# Patient Record
Sex: MALE | Race: WHITE | NOT HISPANIC OR LATINO | Employment: FULL TIME | URBAN - METROPOLITAN AREA
[De-identification: names, ages, dates, MRNs, and addresses within clinical notes are randomized per-mention and may not be internally consistent; named-entity substitution may affect disease eponyms.]

---

## 2017-09-26 ENCOUNTER — OFFICE VISIT (OUTPATIENT)
Dept: FAMILY MEDICINE CLINIC | Facility: CLINIC | Age: 37
End: 2017-09-26

## 2017-09-26 VITALS
OXYGEN SATURATION: 98 % | SYSTOLIC BLOOD PRESSURE: 146 MMHG | DIASTOLIC BLOOD PRESSURE: 92 MMHG | BODY MASS INDEX: 31.89 KG/M2 | TEMPERATURE: 98 F | WEIGHT: 235.4 LBS | HEART RATE: 82 BPM | HEIGHT: 72 IN

## 2017-09-26 DIAGNOSIS — E55.9 VITAMIN D DEFICIENCY: ICD-10-CM

## 2017-09-26 DIAGNOSIS — I10 ESSENTIAL HYPERTENSION: ICD-10-CM

## 2017-09-26 DIAGNOSIS — Z00.00 ROUTINE ADULT HEALTH MAINTENANCE: Primary | ICD-10-CM

## 2017-09-26 PROCEDURE — 99385 PREV VISIT NEW AGE 18-39: CPT | Performed by: FAMILY MEDICINE

## 2017-09-26 RX ORDER — MELATONIN
1000 DAILY
COMMUNITY
End: 2022-07-08

## 2017-09-26 RX ORDER — CHLORAL HYDRATE 500 MG
CAPSULE ORAL
COMMUNITY
End: 2022-07-08

## 2017-09-26 RX ORDER — LISINOPRIL 10 MG/1
10 TABLET ORAL DAILY
Qty: 90 TABLET | Refills: 3 | Status: SHIPPED | OUTPATIENT
Start: 2017-09-26 | End: 2022-07-08

## 2017-09-26 RX ORDER — LISINOPRIL 10 MG/1
10 TABLET ORAL DAILY
COMMUNITY
End: 2017-09-26 | Stop reason: SDUPTHER

## 2017-09-26 RX ORDER — LANOLIN ALCOHOL/MO/W.PET/CERES
1000 CREAM (GRAM) TOPICAL DAILY
COMMUNITY
End: 2022-07-08

## 2017-09-26 RX ORDER — GLUCOSAMINE SULFATE 500 MG
CAPSULE ORAL
COMMUNITY
End: 2022-07-08

## 2017-09-26 NOTE — PROGRESS NOTES
"  Chief Complaint   Patient presents with   • New Patient   • Annual Exam   • Hypertension       Subjective   Igor Smith is a 37 y.o. male and is here for a yearly physical exam. The patient reports no problems.    Do you take any herbs or supplements that were not prescribed by a doctor? yes. If so, these will be added to active medication list.    The following portions of the patient's history were reviewed and updated as appropriate: allergies, current medications, past family history, past medical history, past social history, past surgical history and problem list.    Social and Family and Surgical History reviewed and updated today, see Rooming tab.    Health History, Preventive Measures and Vaccination flow sheets reviewed and updated today.    Patient's current medical chart in Epic; including previous office notes, imaging, labs, specialist's evaluation either in notes or in Media tab reviewed today.    Other pertinent medical information also reviewed thru Care Everywhere function is also reviewed today.    Review of Systems  Review of Systems  A comprehensive review of systems was negative except for: Musculoskeletal: positive for left hip pain    Vitals:    09/26/17 1125   BP: 146/92   BP Location: Left arm   Patient Position: Sitting   Pulse: 82   Temp: 98 °F (36.7 °C)   SpO2: 98%   Weight: 235 lb 6.4 oz (107 kg)   Height: 72\" (182.9 cm)       General Appearance:  Alert, cooperative, no distress, appears stated age   Head:  Normocephalic, without obvious abnormality, atraumatic   Eyes:  PERRL, conjunctiva/corneas clear, EOM's intact.   Ears:  Normal TM's and external ear canals, both ears   Nose: Nares normal, septum midline, mucosa normal, no drainage or sinus tenderness   Throat: Lips, mucosa, and tongue normal; teeth and gums normal   Neck: Supple, symmetrical, trachea midline, no adenopathy;   thyroid: No enlargement/tenderness/nodules; no carotid  bruit   Back:  Symmetric, no curvature, ROM " normal, no CVA tenderness   Lungs:  Clear to auscultation bilaterally, respirations unlabored   Chest wall:  No tenderness or deformity   Heart:  Regular rate and rhythm, S1 and S2 normal, no murmur, rub or gallop   Abdomen:  Soft, non-tender, bowel sounds active all four quadrants,   no masses, no organomegaly   Rectal:        Extremities: Extremities normal, atraumatic, no cyanosis or edema   Pulses: 2+ and symmetric all extremities   Skin: Skin color, texture, turgor normal, no rashes or lesions   Lymph nodes: Cervical, supraclavicular, and axillary nodes normal   Neurologic: CNII-XII intact. Normal strength, sensation and reflexes   throughout        No results found for this or any previous visit.  Assessment/Plan   Healthy male exam.  Igor was seen today for new patient, annual exam and hypertension.    Diagnoses and all orders for this visit:    Routine adult health maintenance    Essential hypertension  -     lisinopril (PRINIVIL,ZESTRIL) 10 MG tablet; Take 1 tablet by mouth Daily. Indications: High Blood Pressure Disorder  -     Lipid Panel; Future  -     CBC (No Diff); Future  -     Comprehensive Metabolic Panel; Future  -     Urinalysis With / Microscopic If Indicated; Future    Vitamin D deficiency  -     Vitamin D 25 Hydroxy; Future      1. New patient from Dr Hamilton's closed practice  His wife est care with me as well a few weeks ago  Time to restart his lisinopril, has dose done well to lost 50 lbs, but has leveled off and bp still not to goal  May have hip surgery soon  2. Patient Counseling:  --Nutrition: Stressed importance of moderation in sodium/caffeine intake, saturated fat and cholesterol.  Discussed caloric balance, sufficient intake of fresh fruits, vegetables, fiber, calcium, iron. Keep working  --  --Exercise: Stressed the importance of regular exercise. restart  --Substance Abuse: Discussed cessation/primary prevention of tobacco, alcohol, or other drug use; driving or other dangerous  activities under the influence. Good here   --Dental health: Discussed importance of regular tooth brushing, flossing, and dental visits. Next week  -- suggested having eyes and vision checked if needed or past due.  --Immunizations reviewed.  --  3. Discussed the patient's BMI with him.  The BMI is above average; BMI management plan is completed  4. Follow up in 6 months    There are no Patient Instructions on file for this visit.    Medications Discontinued During This Encounter   Medication Reason   • Chlorcyclizine-Pseudoephed (STAHIST AD) 25-60 MG tablet Therapy completed   • lisinopril (PRINIVIL,ZESTRIL) 10 MG tablet Reorder        Dr. Jimmy Galvan MD  [place], [place]  [facility]

## 2018-03-26 ENCOUNTER — RESULTS ENCOUNTER (OUTPATIENT)
Dept: FAMILY MEDICINE CLINIC | Facility: CLINIC | Age: 38
End: 2018-03-26

## 2018-03-26 DIAGNOSIS — I10 ESSENTIAL HYPERTENSION: ICD-10-CM

## 2018-03-26 DIAGNOSIS — E55.9 VITAMIN D DEFICIENCY: ICD-10-CM

## 2021-03-20 ENCOUNTER — IMMUNIZATION (OUTPATIENT)
Dept: VACCINE CLINIC | Facility: HOSPITAL | Age: 41
End: 2021-03-20

## 2021-03-20 PROCEDURE — 91300 HC SARSCOV02 VAC 30MCG/0.3ML IM: CPT | Performed by: INTERNAL MEDICINE

## 2021-03-20 PROCEDURE — 0001A: CPT | Performed by: INTERNAL MEDICINE

## 2021-04-10 ENCOUNTER — IMMUNIZATION (OUTPATIENT)
Dept: VACCINE CLINIC | Facility: HOSPITAL | Age: 41
End: 2021-04-10

## 2021-04-10 PROCEDURE — 0002A: CPT | Performed by: INTERNAL MEDICINE

## 2021-04-10 PROCEDURE — 91300 HC SARSCOV02 VAC 30MCG/0.3ML IM: CPT | Performed by: INTERNAL MEDICINE

## 2021-10-14 ENCOUNTER — TELEPHONE (OUTPATIENT)
Dept: FAMILY MEDICINE CLINIC | Facility: CLINIC | Age: 41
End: 2021-10-14

## 2021-10-14 NOTE — TELEPHONE ENCOUNTER
Caller: Igor Smith    Relationship: Self    What was the call regarding: PATIENT IS ESTABLISHED WITH DR. ABDI AS HIS PCP, WAS LAST SEEN BY HER IN November OF 2020, CAN WE CHANGE HIS PCP IN [facility]?

## 2021-10-19 ENCOUNTER — IMMUNIZATION (OUTPATIENT)
Dept: VACCINE CLINIC | Facility: HOSPITAL | Age: 41
End: 2021-10-19

## 2021-10-19 PROCEDURE — 91300 HC SARSCOV02 VAC 30MCG/0.3ML IM: CPT | Performed by: INTERNAL MEDICINE

## 2021-10-19 PROCEDURE — 0004A ADM SARSCOV2 30MCG/0.3ML BOOSTER: CPT | Performed by: INTERNAL MEDICINE

## 2022-07-08 ENCOUNTER — OFFICE VISIT (OUTPATIENT)
Dept: INTERNAL MEDICINE | Facility: CLINIC | Age: 42
End: 2022-07-08

## 2022-07-08 VITALS
DIASTOLIC BLOOD PRESSURE: 84 MMHG | SYSTOLIC BLOOD PRESSURE: 138 MMHG | HEART RATE: 88 BPM | OXYGEN SATURATION: 98 % | BODY MASS INDEX: 33.77 KG/M2 | WEIGHT: 249 LBS | RESPIRATION RATE: 16 BRPM

## 2022-07-08 DIAGNOSIS — Z11.59 ENCOUNTER FOR HEPATITIS C SCREENING TEST FOR LOW RISK PATIENT: ICD-10-CM

## 2022-07-08 DIAGNOSIS — Z00.00 ROUTINE GENERAL MEDICAL EXAMINATION AT A HEALTH CARE FACILITY: Primary | ICD-10-CM

## 2022-07-08 PROBLEM — M16.12 PRIMARY OSTEOARTHRITIS OF LEFT HIP: Status: ACTIVE | Noted: 2022-07-08

## 2022-07-08 PROBLEM — Z96.642 STATUS POST LEFT HIP REPLACEMENT: Status: ACTIVE | Noted: 2018-01-17

## 2022-07-08 PROCEDURE — 99386 PREV VISIT NEW AGE 40-64: CPT | Performed by: INTERNAL MEDICINE

## 2022-07-08 NOTE — PROGRESS NOTES
Chief Complaint   Patient presents with   • Annual Exam       Subjective   History of Present Illness    Igor Smith is a 42 y.o. male here for annual wellness. Pt does not have any acute issues to discuss today. States overall things are going well. Taking all meds as prescribed without any issues.     The following portions of the patient's history were reviewed and updated as appropriate: allergies, current medications, past family history, past medical history, past social history, past surgical history, and problem list.    Patient Care Team:  Crystal Ugarte MD as PCP - General (Internal Medicine & Pediatrics)    Review of Systems   Constitutional: Negative for activity change, chills and fever.   HENT: Negative for congestion, ear pain, rhinorrhea and sore throat.    Eyes: Negative for double vision, pain and visual disturbance.   Respiratory: Negative for cough, shortness of breath and wheezing.    Cardiovascular: Negative for chest pain, palpitations and leg swelling.   Gastrointestinal: Negative for abdominal pain, blood in stool, constipation, diarrhea, nausea and vomiting.   Endocrine: Negative for cold intolerance and heat intolerance.   Genitourinary: Negative for difficulty urinating, dysuria and frequency.   Musculoskeletal: Negative for arthralgias and myalgias.   Skin: Negative for rash and skin lesions.   Neurological: Negative for dizziness, tremors and headache.   Hematological: Negative for adenopathy. Does not bruise/bleed easily.   Psychiatric/Behavioral: Negative for behavioral problems and suicidal ideas.       Body mass index is 33.77 kg/m².   Vitals:    07/08/22 1046   BP: 138/84   BP Location: Left arm   Patient Position: Sitting   Cuff Size: Large Adult   Pulse: 88   Resp: 16   SpO2: 98%   Weight: 113 kg (249 lb)        Objective   Physical Exam  Vitals and nursing note reviewed.   Constitutional:       General: He is not in acute distress.     Appearance: Normal appearance.    HENT:      Head: Normocephalic and atraumatic.      Right Ear: Tympanic membrane and ear canal normal.      Left Ear: Tympanic membrane and ear canal normal.      Nose: Nose normal.      Mouth/Throat:      Mouth: Mucous membranes are moist.      Pharynx: Oropharynx is clear.   Eyes:      Extraocular Movements: Extraocular movements intact.      Conjunctiva/sclera: Conjunctivae normal.      Pupils: Pupils are equal, round, and reactive to light.   Cardiovascular:      Rate and Rhythm: Normal rate and regular rhythm.      Heart sounds: Normal heart sounds. No murmur heard.  Pulmonary:      Effort: Pulmonary effort is normal. No respiratory distress.      Breath sounds: Normal breath sounds. No wheezing or rhonchi.   Abdominal:      General: Bowel sounds are normal. There is no distension.      Palpations: Abdomen is soft. There is no mass.      Tenderness: There is no abdominal tenderness.      Comments: no hepatosplenomegaly   Musculoskeletal:         General: No deformity. Normal range of motion.      Cervical back: Normal range of motion and neck supple.   Skin:     General: Skin is warm and dry.      Capillary Refill: Capillary refill takes less than 2 seconds.      Findings: No lesion or rash.   Neurological:      General: No focal deficit present.      Mental Status: He is alert and oriented to person, place, and time.      Cranial Nerves: No cranial nerve deficit.   Psychiatric:         Mood and Affect: Mood normal.         Behavior: Behavior normal.         Judgment: Judgment normal.       No current outpatient medications on file.       Health Maintenance   Topic Date Due   • INFLUENZA VACCINE  10/01/2022   • TDAP/TD VACCINES (2 - Td or Tdap) 06/01/2026        Immunization History   Administered Date(s) Administered   • COVID-19 (PFIZER) PURPLE CAP 03/20/2021, 04/10/2021, 10/19/2021   • Tdap 06/01/2016       Assessment & Plan     Annual Wellness  - Labs ordered today including CBC, CMP, Fasting lipid panel,  HgbA1C, TSH, Vit D and Hep C. Will call with results once available and make follow up plan and med changes as appropriate.   - Cont current medications for chronic medical issues, refills sent as needed today.   - EKG not indicated  - Cscope not yet indicated, due at 48 yo  - Prostate CA screening- not yet indicated based on age and lack of risk factors for premature disease  - Lung CA screening not indicated  - AAA screening not indicated   - Immunizations: Flu shot due Fall 2022. COVID series and booster completed. TDaP done 2016, UTD. Hep A series completed prior to travel for work in 2000s.    - Depression screen reviewed with patient and negative.  - Advised behavioral modifications including dietary changes and increased exercise with goal of healthy weight and lifestyle.       Return in about 1 year (around 7/8/2023) for Annual physical.     Katarina Ugarte MD  [facility] [place] [place] [facility]  Phone: [phone]  Fax: [phone]

## 2022-07-09 LAB
25(OH)D3+25(OH)D2 SERPL-MCNC: 35.5 NG/ML (ref 30–100)
ALBUMIN SERPL-MCNC: 4.8 G/DL (ref 4–5)
ALBUMIN/GLOB SERPL: 2.3 {RATIO} (ref 1.2–2.2)
ALP SERPL-CCNC: 65 IU/L (ref 44–121)
ALT SERPL-CCNC: 35 IU/L (ref 0–44)
AST SERPL-CCNC: 28 IU/L (ref 0–40)
BASOPHILS # BLD AUTO: 0 X10E3/UL (ref 0–0.2)
BASOPHILS NFR BLD AUTO: 1 %
BILIRUB SERPL-MCNC: 0.6 MG/DL (ref 0–1.2)
BUN SERPL-MCNC: 14 MG/DL (ref 6–24)
BUN/CREAT SERPL: 18 (ref 9–20)
CALCIUM SERPL-MCNC: 9.8 MG/DL (ref 8.7–10.2)
CHLORIDE SERPL-SCNC: 101 MMOL/L (ref 96–106)
CHOLEST SERPL-MCNC: 255 MG/DL (ref 100–199)
CO2 SERPL-SCNC: 25 MMOL/L (ref 20–29)
CREAT SERPL-MCNC: 0.78 MG/DL (ref 0.76–1.27)
EGFRCR SERPLBLD CKD-EPI 2021: 116 ML/MIN/1.73
EOSINOPHIL # BLD AUTO: 0 X10E3/UL (ref 0–0.4)
EOSINOPHIL NFR BLD AUTO: 1 %
ERYTHROCYTE [DISTWIDTH] IN BLOOD BY AUTOMATED COUNT: 12.3 % (ref 11.6–15.4)
GLOBULIN SER CALC-MCNC: 2.1 G/DL (ref 1.5–4.5)
GLUCOSE SERPL-MCNC: 94 MG/DL (ref 65–99)
HBA1C MFR BLD: 5.8 % (ref 4.8–5.6)
HCT VFR BLD AUTO: 47.9 % (ref 37.5–51)
HCV AB S/CO SERPL IA: <0.1 S/CO RATIO (ref 0–0.9)
HDLC SERPL-MCNC: 41 MG/DL
HGB BLD-MCNC: 16.1 G/DL (ref 13–17.7)
IMM GRANULOCYTES # BLD AUTO: 0 X10E3/UL (ref 0–0.1)
IMM GRANULOCYTES NFR BLD AUTO: 0 %
LDLC SERPL CALC-MCNC: 176 MG/DL (ref 0–99)
LYMPHOCYTES # BLD AUTO: 2.4 X10E3/UL (ref 0.7–3.1)
LYMPHOCYTES NFR BLD AUTO: 41 %
MCH RBC QN AUTO: 29.8 PG (ref 26.6–33)
MCHC RBC AUTO-ENTMCNC: 33.6 G/DL (ref 31.5–35.7)
MCV RBC AUTO: 89 FL (ref 79–97)
MONOCYTES # BLD AUTO: 0.6 X10E3/UL (ref 0.1–0.9)
MONOCYTES NFR BLD AUTO: 10 %
NEUTROPHILS # BLD AUTO: 2.8 X10E3/UL (ref 1.4–7)
NEUTROPHILS NFR BLD AUTO: 47 %
PLATELET # BLD AUTO: 170 X10E3/UL (ref 150–450)
POTASSIUM SERPL-SCNC: 4.5 MMOL/L (ref 3.5–5.2)
PROT SERPL-MCNC: 6.9 G/DL (ref 6–8.5)
RBC # BLD AUTO: 5.4 X10E6/UL (ref 4.14–5.8)
SODIUM SERPL-SCNC: 141 MMOL/L (ref 134–144)
TRIGL SERPL-MCNC: 200 MG/DL (ref 0–149)
TSH SERPL DL<=0.005 MIU/L-ACNC: 1.65 UIU/ML (ref 0.45–4.5)
VLDLC SERPL CALC-MCNC: 38 MG/DL (ref 5–40)
WBC # BLD AUTO: 5.8 X10E3/UL (ref 3.4–10.8)

## 2022-07-26 ENCOUNTER — TELEPHONE (OUTPATIENT)
Dept: INTERNAL MEDICINE | Facility: CLINIC | Age: 42
End: 2022-07-26

## 2022-07-26 NOTE — TELEPHONE ENCOUNTER
Caller: Igor Smith KENNY    Relationship: Self    Best call back number:[phone] (H)    What is the best time to reach you: ANYTIME, ASAP    Who are you requesting to speak with (clinical staff, provider,  specific staff member): CLINICAL STAFF    Do you know the name of the person who called: IGOR DONALD    What was the call regarding: PATIENT STATES HE WAS BILLED FOR [facility] DOS 07/08/2022 FOR VITAMIN D LAB TESTING BECAUSE THE INSURANCE REQUIRES A LETTER STATING THAT THE PATIENT HAS A HISTORY OF VITAMIN D DEFICIENCY BEFORE THEY WILL PAY FOR THIS IN FULL FOR THE PATIENT, THE LETTER NEEDS TO BE A STATEMENT FROM DR ABDI STATING THE PATIENT NEEDED TO HAVE VITAMIN D TESTING AS PART OF HIS ANNUAL PHYSICAL, PLEASE ADVISE PATIENT IF THIS LETTER CAN BE PROVIDED ON LETTERHEAD FROM THE OFFICE AND SIGNED BY DR ABDI SO PATIENT CAN PICK THIS UP AND PROVIDE THIS TO HIS INSURANCE ASAP    Do you require a callback: YES, PLEASE ADVISE PATIENT WHEN READY TO BE PICKED UP

## 2022-08-15 ENCOUNTER — TELEPHONE (OUTPATIENT)
Dept: INTERNAL MEDICINE | Facility: CLINIC | Age: 42
End: 2022-08-15

## 2022-08-15 NOTE — TELEPHONE ENCOUNTER
Caller: Igor Smith    Relationship: Self    Best call back number:     What medication are you requesting:     What are your current symptoms: GOUT    How long have you been experiencing symptoms: RIGHT BIG TOE NUMB, PT HAS GOUT    Have you had these symptoms before:    [] Yes  [x] No    Have you been treated for these symptoms before:   [] Yes  [x] No    If a prescription is needed, what is your preferred pharmacy and phone number:  [facility]  [address] [place] [address]  [phone]    Additional notes: PATIENT IS CALLING IN STATING THAT HE HAS GOUT AND HE IS FEELING THAT HIS TOE IS NUMB AT TIMES AND FEELS LIKE IT IS THROBBING

## 2022-08-15 NOTE — TELEPHONE ENCOUNTER
Gout is usually more associated with severe pain than numbness, so may not be related to gout, does he feel like this is his typical flare? Any redness or swelling in that joint? He may need to actually be seen because if it is not gout than we need to figure out why his toes are going numb

## 2022-08-16 ENCOUNTER — OFFICE VISIT (OUTPATIENT)
Dept: INTERNAL MEDICINE | Facility: CLINIC | Age: 42
End: 2022-08-16

## 2022-08-16 VITALS
HEIGHT: 72 IN | DIASTOLIC BLOOD PRESSURE: 82 MMHG | BODY MASS INDEX: 33.72 KG/M2 | RESPIRATION RATE: 16 BRPM | WEIGHT: 249 LBS | HEART RATE: 83 BPM | SYSTOLIC BLOOD PRESSURE: 140 MMHG | TEMPERATURE: 98.2 F | OXYGEN SATURATION: 96 %

## 2022-08-16 DIAGNOSIS — M79.671 RIGHT FOOT PAIN: Primary | ICD-10-CM

## 2022-08-16 PROCEDURE — 99213 OFFICE O/P EST LOW 20 MIN: CPT | Performed by: INTERNAL MEDICINE

## 2022-08-16 RX ORDER — LISINOPRIL 10 MG/1
10 TABLET ORAL DAILY
COMMUNITY

## 2022-08-16 RX ORDER — CHLORAL HYDRATE 500 MG
CAPSULE ORAL
COMMUNITY

## 2022-08-16 NOTE — PROGRESS NOTES
"Chief Complaint  Toe Pain    Subjective          Igor Smith presents to [facility] for R big toe pain. First noticed about 1 week ago with some numbness, pins and needles sensation. Did have some throbbing. Tried to walk it out and monitoring and didn't seem to be improving. With socks and shoes it does seem to resolve and is not noticeable, but then when shoes came off the pain and tenderness seemed to be extending more proximally. No erythema or swelling compared to L foot.   Has never had gout or anything like this in the past.     Objective   Vital Signs:     /82   Pulse 83   Temp 98.2 °F (36.8 °C)   Resp 16   Ht 182.9 cm (72\")   Wt 113 kg (249 lb)   SpO2 96%   BMI 33.77 kg/m²     Physical Exam  Vitals and nursing note reviewed.   Constitutional:       General: He is not in acute distress.     Appearance: Normal appearance.   Cardiovascular:      Pulses: Normal pulses.           Dorsalis pedis pulses are 2+ on the right side and 2+ on the left side.        Posterior tibial pulses are 2+ on the right side and 2+ on the left side.   Pulmonary:      Effort: Pulmonary effort is normal. No respiratory distress.   Feet:      Right foot:      Skin integrity: Skin integrity normal.      Left foot:      Skin integrity: Skin integrity normal.      Comments: R big toe with no overlying erythema, no edema, no warmth to touch, TTP over navicular and at head of 1st MT, full ROM of hallux but with palpable \"rubbing\" of flexor tendon with passive and active ROM, 2 sec cap refill  Neurological:      Mental Status: He is alert.          Result Review :   The following data was reviewed by: Crystal Ugarte MD on 08/16/2022:  CMP    CMP 7/8/22   Glucose 94   BUN 14   Creatinine 0.78   Sodium 141   Potassium 4.5   Chloride 101   Calcium 9.8   Total Protein 6.9   Albumin 4.8   Globulin 2.1   Total Bilirubin 0.6   Alkaline Phosphatase 65   AST (SGOT) 28   ALT (SGPT) " 35           CBC w/diff    CBC w/Diff 7/8/22   WBC 5.8   RBC 5.40   Hemoglobin 16.1   Hematocrit 47.9   MCV 89   MCH 29.8   MCHC 33.6   RDW 12.3   Platelets 170   Neutrophil Rel % 47   Lymphocyte Rel % 41   Monocyte Rel % 10   Eosinophil Rel % 1   Basophil Rel % 1           Lipid Panel    Lipid Panel 7/8/22   Total Cholesterol 255 (A)   Triglycerides 200 (A)   HDL Cholesterol 41   VLDL Cholesterol 38   LDL Cholesterol  176 (A)   (A) Abnormal value            TSH    TSH 7/8/22   TSH 1.650           Most Recent A1C    HGBA1C Most Recent 7/8/22   Hemoglobin A1C 5.8 (A)   (A) Abnormal value       Comments are available for some flowsheets but are not being displayed.           Assessment and Plan      Diagnoses and all orders for this visit:    1. Right foot pain (Primary)   - defer imaging until he sees the foot specialist   - suspect a flexor tendonopathy given exam, may benefit from injection therapy      Orders:  -     Ambulatory Referral to Podiatry        Follow Up   Return for Next scheduled follow up.    Patient was given instructions and counseling regarding his condition or for health maintenance advice. Please see specific information pulled into the AVS if appropriate.     Katarina Ugarte MD  [facility] [place] [facility] [place] [facility]  Phone: [phone]  Fax: [phone]

## 2022-09-20 ENCOUNTER — TELEPHONE (OUTPATIENT)
Dept: INTERNAL MEDICINE | Facility: CLINIC | Age: 42
End: 2022-09-20

## 2022-09-20 NOTE — TELEPHONE ENCOUNTER
Caller: Kayla Hernandez    Relationship: Self    Best call back number: [phone]    What medication are you requesting: PIN WORM MEDICATION    What are your current symptoms: PINWORM      If a prescription is needed, what is your preferred pharmacy and phone number: [facility] [address] - [phone]  - [phone] FX     Additional notes:

## 2022-09-21 ENCOUNTER — TELEPHONE (OUTPATIENT)
Dept: INTERNAL MEDICINE | Facility: CLINIC | Age: 42
End: 2022-09-21

## 2022-09-21 RX ORDER — ALBENDAZOLE 200 MG/1
TABLET, FILM COATED ORAL
Qty: 4 TABLET | Refills: 0 | Status: SHIPPED | OUTPATIENT
Start: 2022-09-21

## 2022-09-21 RX ORDER — ALBENDAZOLE 200 MG/1
TABLET, FILM COATED ORAL
Qty: 4 TABLET | Refills: 0 | Status: SHIPPED | OUTPATIENT
Start: 2022-09-21 | End: 2022-09-21 | Stop reason: SDUPTHER

## 2022-09-21 NOTE — TELEPHONE ENCOUNTER
Will agree since his children have this diagnosis but if he develops symptoms or does not respond to this he will have to have an appt  Dosing is different for adults, its 400 mg not 200, but have sent this in to the  pharmacy

## 2022-09-21 NOTE — TELEPHONE ENCOUNTER
PATIENT CALLED WANTING TO REQUEST MEDICATION   albendazole (ALBENZA) 200 MG tablet  TO GO TO     [facility] [address] [place], [place] [address]Y - [phone]  - [phone] FX  [phone]    CALL BACK NUMBER [phone]

## 2022-09-21 NOTE — TELEPHONE ENCOUNTER
CORRECTION  PATIENT WOULD LIKE THIS MEDICATION SENT TO THE [facility]       [facility] [address] - [phone]  - [phone] FX

## 2022-09-21 NOTE — TELEPHONE ENCOUNTER
Caller: Igor Smith    Relationship: Self    Best call back number: [phone]    What medication are you requesting: PIN WORM MEDICATION     What are your current symptoms: PIN WORM     If a prescription is needed, what is your preferred pharmacy and phone number:  [id number] - [place], [place] - [address] [address] [address] - [phone] ph - [phone]     Additional notes: BOTH OF THE PATIENTS CHILDREN HAVE BEEN DIAGNOSED WITH PIN WORM. THE CHILDRENS PCP ADVISED THE PATIENT AND HIS WIFE TO GO AHEAD AND GET THIS MEDICATION FOR THEM AS WELL.

## 2022-09-22 NOTE — TELEPHONE ENCOUNTER
Just sent you another message on this, I sent in the treatment yesterday but looks like somehow the message had gotten to Dr. Santos as well so he had also called it in and my order may have cancelled his, so just want to verify if he was able to pick it up and make sure the pharamcy has it

## 2023-10-17 ENCOUNTER — OFFICE VISIT (OUTPATIENT)
Dept: INTERNAL MEDICINE | Facility: CLINIC | Age: 43
End: 2023-10-17
Payer: COMMERCIAL

## 2023-10-17 VITALS
SYSTOLIC BLOOD PRESSURE: 180 MMHG | TEMPERATURE: 96.2 F | RESPIRATION RATE: 22 BRPM | OXYGEN SATURATION: 98 % | WEIGHT: 257 LBS | BODY MASS INDEX: 34.81 KG/M2 | HEART RATE: 80 BPM | HEIGHT: 72 IN | DIASTOLIC BLOOD PRESSURE: 98 MMHG

## 2023-10-17 DIAGNOSIS — R05.9 COUGH, UNSPECIFIED TYPE: Primary | ICD-10-CM

## 2023-10-17 PROCEDURE — 99213 OFFICE O/P EST LOW 20 MIN: CPT | Performed by: INTERNAL MEDICINE

## 2023-10-20 NOTE — PROGRESS NOTES
"Chief Complaint  Cough and Fatigue    Subjective        Igor Smith presents to [facility]  History of Present Illness  Here with cough, congestion, fatigue; no chest pain, no sob      Objective   Vital Signs:  /98   Pulse 80   Temp 96.2 °F (35.7 °C)   Resp 22   Ht 182.9 cm (72\")   Wt 117 kg (257 lb)   SpO2 98%   BMI 34.86 kg/m²   Estimated body mass index is 34.86 kg/m² as calculated from the following:    Height as of this encounter: 182.9 cm (72\").    Weight as of this encounter: 117 kg (257 lb).             Physical Exam  Vitals and nursing note reviewed.   Constitutional:       General: He is not in acute distress.     Appearance: Normal appearance.   HENT:      Head: Normocephalic and atraumatic.      Right Ear: External ear normal.      Left Ear: External ear normal.      Nose: Nose normal.      Mouth/Throat:      Mouth: Mucous membranes are moist.      Pharynx: Oropharynx is clear.   Eyes:      Extraocular Movements: Extraocular movements intact.      Conjunctiva/sclera: Conjunctivae normal.      Pupils: Pupils are equal, round, and reactive to light.   Cardiovascular:      Rate and Rhythm: Normal rate and regular rhythm.      Pulses: Normal pulses.      Heart sounds: Normal heart sounds. No murmur heard.     No gallop.   Pulmonary:      Effort: Pulmonary effort is normal.      Breath sounds: Normal breath sounds.   Abdominal:      General: Abdomen is flat. Bowel sounds are normal. There is no distension.      Palpations: Abdomen is soft. There is no mass.      Tenderness: There is no abdominal tenderness.   Musculoskeletal:         General: No swelling. Normal range of motion.      Cervical back: Normal range of motion and neck supple.   Skin:     General: Skin is warm and dry.      Findings: No rash.   Neurological:      General: No focal deficit present.      Mental Status: He is alert and oriented to person, place, and time. Mental status " is at baseline.   Psychiatric:         Mood and Affect: Mood normal.         Behavior: Behavior normal.        Result Review :                   Assessment and Plan   Diagnoses and all orders for this visit:    1. Cough, unspecified type (Primary)    - supportive care, fluids, hydration  - rtc worsening, change in illness  - tylenol as needed         Follow Up   No follow-ups on file.  Patient was given instructions and counseling regarding his condition or for health maintenance advice. Please see specific information pulled into the AVS if appropriate.

## 2023-11-14 ENCOUNTER — OFFICE VISIT (OUTPATIENT)
Dept: INTERNAL MEDICINE | Facility: CLINIC | Age: 43
End: 2023-11-14
Payer: COMMERCIAL

## 2023-11-14 VITALS
DIASTOLIC BLOOD PRESSURE: 82 MMHG | BODY MASS INDEX: 34.54 KG/M2 | HEART RATE: 79 BPM | HEIGHT: 72 IN | SYSTOLIC BLOOD PRESSURE: 128 MMHG | WEIGHT: 255 LBS

## 2023-11-14 DIAGNOSIS — Z00.00 ROUTINE GENERAL MEDICAL EXAMINATION AT A HEALTH CARE FACILITY: Primary | ICD-10-CM

## 2023-11-14 DIAGNOSIS — E55.9 VITAMIN D DEFICIENCY: ICD-10-CM

## 2023-11-14 DIAGNOSIS — I10 ESSENTIAL HYPERTENSION: ICD-10-CM

## 2023-11-14 PROCEDURE — 99396 PREV VISIT EST AGE 40-64: CPT | Performed by: INTERNAL MEDICINE

## 2023-11-14 NOTE — PROGRESS NOTES
"Chief Complaint   Patient presents with    Annual Exam       Subjective   History of Present Illness    Igor Smith is a 43 y.o. male here for annual wellness. Pt does not have any acute issues to discuss today. States overall things are going well. Taking all meds as prescribed without any issues.   BP was back in normal range today, was seen a couple weeks ago and was very elevated. Is not currently taking any medications.     The following portions of the patient's history were reviewed and updated as appropriate: allergies, current medications, past family history, past medical history, past social history, past surgical history, and problem list.    Patient Care Team:  Crystal Ugarte MD as PCP - General (Internal Medicine & Pediatrics)    Review of Systems   Constitutional:  Negative for activity change, chills and fever.   HENT:  Negative for congestion, ear pain, rhinorrhea and sore throat.    Eyes:  Negative for double vision, pain and visual disturbance.   Respiratory:  Negative for cough, shortness of breath and wheezing.    Cardiovascular:  Negative for chest pain, palpitations and leg swelling.   Gastrointestinal:  Negative for abdominal pain, blood in stool, constipation, diarrhea, nausea and vomiting.   Endocrine: Negative for cold intolerance and heat intolerance.   Genitourinary:  Negative for difficulty urinating, dysuria and frequency.   Musculoskeletal:  Negative for arthralgias and myalgias.   Skin:  Negative for rash and skin lesions.   Neurological:  Negative for dizziness, tremors and headache.   Hematological:  Negative for adenopathy. Does not bruise/bleed easily.   Psychiatric/Behavioral:  Negative for behavioral problems and suicidal ideas.        Body mass index is 34.58 kg/m².   Vitals:    11/14/23 0938   BP: 128/82   BP Location: Left arm   Patient Position: Sitting   Cuff Size: Adult   Pulse: 79   Weight: 116 kg (255 lb)   Height: 182.9 cm (72.01\")   PF: 99 L/min    "     Objective   Physical Exam  Vitals and nursing note reviewed.   Constitutional:       General: He is not in acute distress.     Appearance: Normal appearance.   HENT:      Head: Normocephalic and atraumatic.      Right Ear: Tympanic membrane and ear canal normal.      Left Ear: Tympanic membrane and ear canal normal.      Nose: Nose normal.      Mouth/Throat:      Mouth: Mucous membranes are moist.      Pharynx: Oropharynx is clear.   Eyes:      Extraocular Movements: Extraocular movements intact.      Conjunctiva/sclera: Conjunctivae normal.      Pupils: Pupils are equal, round, and reactive to light.   Cardiovascular:      Rate and Rhythm: Normal rate and regular rhythm.      Heart sounds: Normal heart sounds. No murmur heard.  Pulmonary:      Effort: Pulmonary effort is normal. No respiratory distress.      Breath sounds: Normal breath sounds. No wheezing or rhonchi.   Abdominal:      General: Bowel sounds are normal. There is no distension.      Palpations: Abdomen is soft. There is no mass.      Tenderness: There is no abdominal tenderness.      Comments: no hepatosplenomegaly   Musculoskeletal:         General: No deformity. Normal range of motion.      Cervical back: Normal range of motion and neck supple.   Skin:     General: Skin is warm and dry.      Capillary Refill: Capillary refill takes less than 2 seconds.      Findings: No lesion or rash.   Neurological:      General: No focal deficit present.      Mental Status: He is alert and oriented to person, place, and time.      Cranial Nerves: No cranial nerve deficit.   Psychiatric:         Mood and Affect: Mood normal.         Behavior: Behavior normal.         Judgment: Judgment normal.         No current outpatient medications on file.     Lab Results   Component Value Date    HGBA1C 5.8 (H) 07/08/2022    TSH 1.650 07/08/2022    RVIC72OC 35.5 07/08/2022        Health Maintenance   Topic Date Due    INFLUENZA VACCINE  Never done    COVID-19 Vaccine (4  - 2023-24 season) 09/01/2023    ANNUAL PHYSICAL  11/14/2024    BMI FOLLOWUP  11/14/2024    TDAP/TD VACCINES (2 - Td or Tdap) 06/01/2026    HEPATITIS C SCREENING  Completed    Pneumococcal Vaccine 0-64  Aged Out        Immunization History   Administered Date(s) Administered    COVID-19 (PFIZER) Purple Cap Monovalent 03/20/2021, 04/10/2021, 10/19/2021    Tdap 06/01/2016       Assessment & Plan     Annual Wellness  - Labs ordered today including CBC, CMP, Fasting lipid panel, HgbA1C, TSH, and Vit D. Will call with results once available and make follow up plan and med changes as appropriate.   - Cont current medications for chronic medical issues, refills sent as needed today.   - EKG not indicated  - Cscope not yet indicated, due at 48 yo  - Prostate CA screening - not yet indicated based on age  - Lung CA screening not indicated  - AAA screening not indicated  - Immunizations: Flu shot due, declined today. COVID series and booster x 2 completed, seasonal booster eligible. TDaP done 2016.    - Depression screen reviewed with patient and negative. Is more irritable and stressed related to work and home life with poor sleep hygiene but is doing ok and managing on his own.   - Advised behavioral modifications including dietary changes and increased exercise with goal of healthy weight and lifestyle. Discussed GLP1 agonist medications for weight loss, pt is interested, will wait to see what opportunities may exist in coming months regarding Zepbound and then may decide to move forward with weight loss injection medication.        Return in about 1 year (around 11/14/2024) for Annual physical.     Katarina Ugarte MD  [facility] [place] [facility]  Phone: [phone]  Fax: [phone]

## 2023-11-15 LAB
25(OH)D3+25(OH)D2 SERPL-MCNC: 28.4 NG/ML (ref 30–100)
ALBUMIN SERPL-MCNC: 4.7 G/DL (ref 3.5–5.2)
ALBUMIN/GLOB SERPL: 2.2 G/DL
ALP SERPL-CCNC: 62 U/L (ref 39–117)
ALT SERPL-CCNC: 29 U/L (ref 1–41)
AST SERPL-CCNC: 22 U/L (ref 1–40)
BASOPHILS # BLD AUTO: 0.01 10*3/MM3 (ref 0–0.2)
BASOPHILS NFR BLD AUTO: 0.2 % (ref 0–1.5)
BILIRUB SERPL-MCNC: 0.7 MG/DL (ref 0–1.2)
BUN SERPL-MCNC: 14 MG/DL (ref 6–20)
BUN/CREAT SERPL: 17.3 (ref 7–25)
CALCIUM SERPL-MCNC: 9.8 MG/DL (ref 8.6–10.5)
CHLORIDE SERPL-SCNC: 100 MMOL/L (ref 98–107)
CHOLEST SERPL-MCNC: 243 MG/DL (ref 0–200)
CO2 SERPL-SCNC: 28.4 MMOL/L (ref 22–29)
CREAT SERPL-MCNC: 0.81 MG/DL (ref 0.76–1.27)
EGFRCR SERPLBLD CKD-EPI 2021: 113.6 ML/MIN/1.73
EOSINOPHIL # BLD AUTO: 0.05 10*3/MM3 (ref 0–0.4)
EOSINOPHIL NFR BLD AUTO: 1 % (ref 0.3–6.2)
ERYTHROCYTE [DISTWIDTH] IN BLOOD BY AUTOMATED COUNT: 12 % (ref 12.3–15.4)
GLOBULIN SER CALC-MCNC: 2.1 GM/DL
GLUCOSE SERPL-MCNC: 97 MG/DL (ref 65–99)
HBA1C MFR BLD: 5.4 % (ref 4.8–5.6)
HCT VFR BLD AUTO: 45.5 % (ref 37.5–51)
HDLC SERPL-MCNC: 39 MG/DL (ref 40–60)
HGB BLD-MCNC: 15.9 G/DL (ref 13–17.7)
IMM GRANULOCYTES # BLD AUTO: 0.01 10*3/MM3 (ref 0–0.05)
IMM GRANULOCYTES NFR BLD AUTO: 0.2 % (ref 0–0.5)
LDLC SERPL CALC-MCNC: 170 MG/DL (ref 0–100)
LYMPHOCYTES # BLD AUTO: 1.88 10*3/MM3 (ref 0.7–3.1)
LYMPHOCYTES NFR BLD AUTO: 36.6 % (ref 19.6–45.3)
MCH RBC QN AUTO: 30.4 PG (ref 26.6–33)
MCHC RBC AUTO-ENTMCNC: 34.9 G/DL (ref 31.5–35.7)
MCV RBC AUTO: 87 FL (ref 79–97)
MONOCYTES # BLD AUTO: 0.5 10*3/MM3 (ref 0.1–0.9)
MONOCYTES NFR BLD AUTO: 9.7 % (ref 5–12)
NEUTROPHILS # BLD AUTO: 2.68 10*3/MM3 (ref 1.7–7)
NEUTROPHILS NFR BLD AUTO: 52.3 % (ref 42.7–76)
NRBC BLD AUTO-RTO: 0 /100 WBC (ref 0–0.2)
PLATELET # BLD AUTO: 163 10*3/MM3 (ref 140–450)
POTASSIUM SERPL-SCNC: 4.4 MMOL/L (ref 3.5–5.2)
PROT SERPL-MCNC: 6.8 G/DL (ref 6–8.5)
RBC # BLD AUTO: 5.23 10*6/MM3 (ref 4.14–5.8)
SODIUM SERPL-SCNC: 140 MMOL/L (ref 136–145)
TRIGL SERPL-MCNC: 184 MG/DL (ref 0–150)
TSH SERPL DL<=0.005 MIU/L-ACNC: 1.59 UIU/ML (ref 0.27–4.2)
VLDLC SERPL CALC-MCNC: 34 MG/DL (ref 5–40)
WBC # BLD AUTO: 5.13 10*3/MM3 (ref 3.4–10.8)

## 2024-07-31 ENCOUNTER — OFFICE VISIT (OUTPATIENT)
Dept: INTERNAL MEDICINE | Facility: CLINIC | Age: 44
End: 2024-07-31
Payer: COMMERCIAL

## 2024-07-31 VITALS
HEIGHT: 72 IN | WEIGHT: 248.4 LBS | HEART RATE: 80 BPM | DIASTOLIC BLOOD PRESSURE: 92 MMHG | BODY MASS INDEX: 33.64 KG/M2 | TEMPERATURE: 98.6 F | SYSTOLIC BLOOD PRESSURE: 126 MMHG | OXYGEN SATURATION: 96 %

## 2024-07-31 DIAGNOSIS — J02.9 ACUTE VIRAL PHARYNGITIS: ICD-10-CM

## 2024-07-31 DIAGNOSIS — R05.9 COUGH, UNSPECIFIED TYPE: ICD-10-CM

## 2024-07-31 DIAGNOSIS — J02.9 SORE THROAT: Primary | ICD-10-CM

## 2024-07-31 LAB
EXPIRATION DATE: NORMAL
EXPIRATION DATE: NORMAL
FLUAV AG UPPER RESP QL IA.RAPID: NOT DETECTED
FLUBV AG UPPER RESP QL IA.RAPID: NOT DETECTED
INTERNAL CONTROL: NORMAL
INTERNAL CONTROL: NORMAL
Lab: NORMAL
Lab: NORMAL
S PYO AG THROAT QL: NEGATIVE
SARS-COV-2 AG UPPER RESP QL IA.RAPID: NOT DETECTED

## 2024-07-31 PROCEDURE — 87428 SARSCOV & INF VIR A&B AG IA: CPT | Performed by: STUDENT IN AN ORGANIZED HEALTH CARE EDUCATION/TRAINING PROGRAM

## 2024-07-31 PROCEDURE — 87880 STREP A ASSAY W/OPTIC: CPT | Performed by: STUDENT IN AN ORGANIZED HEALTH CARE EDUCATION/TRAINING PROGRAM

## 2024-07-31 PROCEDURE — 99213 OFFICE O/P EST LOW 20 MIN: CPT | Performed by: STUDENT IN AN ORGANIZED HEALTH CARE EDUCATION/TRAINING PROGRAM

## 2024-07-31 RX ORDER — DEXTROMETHORPHAN HYDROBROMIDE AND PROMETHAZINE HYDROCHLORIDE 15; 6.25 MG/5ML; MG/5ML
5 SYRUP ORAL 4 TIMES DAILY PRN
Qty: 180 ML | Refills: 1 | Status: SHIPPED | OUTPATIENT
Start: 2024-07-31

## 2024-07-31 RX ORDER — GUAIFENESIN 600 MG/1
1200 TABLET, EXTENDED RELEASE ORAL 2 TIMES DAILY
Qty: 28 TABLET | Refills: 1 | Status: SHIPPED | OUTPATIENT
Start: 2024-07-31

## 2024-07-31 NOTE — PROGRESS NOTES
"Chief Complaint  Cough, Sore Throat, Headache, Earache, and Fatigue    Subjective        Igor Smith presents to [facility]  History of Present Illness  Igor is an established patient of Crystal Ugarte MD, presenting for cough.  He will be establishing care with Raphael Cornejo MD, in the future.    He has been experiencing a deep cough and chest congestion for the past 3 to 4 days, which is worse at night. Accompanying symptoms include sensitivity in his teeth, congestion behind her ears, and headaches.  He denies having a fever but reports feeling lethargic.  His cough is most severe at night and in the morning, and he coughs up dark yellow mucus in the mornings.  He has a history of sinusitis and reports minimal nasal congestion.  His breathing is normal, although her voice was hoarse and lower this morning.  He denies any changes in her hearing, but mentions ear irritation.  He has not taken any medication for his symptoms.  Entire family has been sick times over the past few weeks.    Objective   Vital Signs:  /92   Pulse 80   Temp 98.6 °F (37 °C)   Ht 182.9 cm (72\")   Wt 113 kg (248 lb 6.4 oz)   SpO2 96%   BMI 33.69 kg/m²   Estimated body mass index is 33.69 kg/m² as calculated from the following:    Height as of this encounter: 182.9 cm (72\").    Weight as of this encounter: 113 kg (248 lb 6.4 oz).               Physical Exam  Vitals and nursing note reviewed.   Constitutional:       General: He is awake. He is not in acute distress.     Appearance: Normal appearance.   HENT:      Head: Normocephalic and atraumatic.      Salivary Glands: Right salivary gland is not diffusely enlarged or tender. Left salivary gland is not diffusely enlarged or tender.      Right Ear: Tympanic membrane, ear canal and external ear normal. There is no impacted cerumen.      Left Ear: Tympanic membrane, ear canal and external ear normal. There is no impacted cerumen. "      Ears:      Comments: Right serous OM     Nose: Nose normal. No congestion or rhinorrhea.      Right Sinus: No maxillary sinus tenderness or frontal sinus tenderness.      Left Sinus: No maxillary sinus tenderness or frontal sinus tenderness.      Mouth/Throat:      Mouth: Mucous membranes are moist.      Pharynx: Oropharynx is clear. No oropharyngeal exudate or posterior oropharyngeal erythema.      Tonsils: No tonsillar exudate.   Eyes:      Extraocular Movements: Extraocular movements intact.   Neck:      Thyroid: No thyroid mass, thyromegaly or thyroid tenderness.      Trachea: Trachea normal.   Cardiovascular:      Rate and Rhythm: Normal rate and regular rhythm.      Heart sounds: No murmur heard.     No friction rub. No gallop.   Pulmonary:      Effort: Pulmonary effort is normal.      Breath sounds: Normal breath sounds. No wheezing, rhonchi or rales.   Musculoskeletal:      Cervical back: Normal range of motion and neck supple. No rigidity or tenderness.   Lymphadenopathy:      Cervical: No cervical adenopathy.   Skin:     General: Skin is warm and dry.   Neurological:      General: No focal deficit present.      Mental Status: He is alert.   Psychiatric:         Mood and Affect: Mood normal.         Behavior: Behavior normal. Behavior is cooperative.        Physical Exam  Right ear shows some fluid but no signs of infection.  Lungs sound clear.    Result Review :          Results  Laboratory Studies  Strep test negative. Covid test negative. Flu test negative.             Assessment and Plan     Diagnoses and all orders for this visit:    1. Sore throat (Primary)  -     POC Rapid Strep A  -     Cancel: POC Influenza A / B  -     Cancel: POCT CATALINO SARS-CoV-2 Antigen MICHA  -     POCT SARS-CoV-2 Antigen MICHA + Flu    2. Cough, unspecified type  -     POC Rapid Strep A  -     Cancel: POC Influenza A / B  -     Cancel: POCT CATALINO SARS-CoV-2 Antigen MICHA  -     POCT SARS-CoV-2 Antigen MICHA + Flu    3. Acute  viral pharyngitis  -     promethazine-dextromethorphan (PROMETHAZINE-DM) 6.25-15 MG/5ML syrup; Take 5 mL by mouth 4 (Four) Times a Day As Needed for Cough.  Dispense: 180 mL; Refill: 1  -     guaiFENesin (Mucinex) 600 MG 12 hr tablet; Take 2 tablets by mouth 2 (Two) Times a Day.  Dispense: 28 tablet; Refill: 1      Assessment & Plan  1. Viral pharyngitis.  Promethazine and Tessalon Perles have been prescribed to manage his cough. Salt water gargles, Mucinex, cough drops, and ibuprofen have been recommended. He has been advised to increase his fluid intake. Flonase has been recommended. Additional information in AVS.         Follow Up     Return if symptoms worsen or fail to improve.  Patient was given instructions and counseling regarding his condition or for health maintenance advice. Please see specific information pulled into the AVS if appropriate.     Patient or patient representative verbalized consent for the use of Ambient Listening during the visit with  Quentin Whelan MD for chart documentation. 8/1/2024  10:40 EDT

## 2024-07-31 NOTE — PATIENT INSTRUCTIONS
You tested negative for Flu, Covid, and Strep today.  Symptoms and exam are consistent with viral pharyngitis.  You can alternate Tylenol and Ibuprofen for fever and body aches.  Make sure you drink plenty of fluids.  You can take flonase to help with ear pressure.  Promethazine DM can help with congestion and cough.  Mucinex to help loosen secretions.  Tea with honey is also good for cough and sore throat.    If you develop fevers that are not controlled with ibuprofen and Tylenol, develop severe shortness of breath, feel like your throat is closing off, you should follow-up with the ER, urgent care, or with this clinic right away.    If we can be of further assistance prior to next clinic visit, feel free to give us a call.

## 2024-11-11 ENCOUNTER — TELEPHONE (OUTPATIENT)
Dept: INTERNAL MEDICINE | Facility: CLINIC | Age: 44
End: 2024-11-11

## 2024-11-11 NOTE — TELEPHONE ENCOUNTER
Caller: Igor Smith    Relationship: Self    Best call back number: [phone]     What orders are you requesting (i.e. lab or imaging): LAB ORDERS FOR UPCOMING PHYSICAL ON 11/18/24    In what timeframe would the patient need to come in: BEFORE 11/18/24    Where will you receive your lab/imaging services: OFFICE    Additional notes: PLEASE CALL PATIENT TO SCHEDULE ONCE THIS IS DONE

## 2024-11-18 ENCOUNTER — OFFICE VISIT (OUTPATIENT)
Dept: INTERNAL MEDICINE | Facility: CLINIC | Age: 44
End: 2024-11-18
Payer: COMMERCIAL

## 2024-11-18 VITALS
HEIGHT: 72 IN | BODY MASS INDEX: 34 KG/M2 | HEART RATE: 84 BPM | SYSTOLIC BLOOD PRESSURE: 128 MMHG | DIASTOLIC BLOOD PRESSURE: 72 MMHG | WEIGHT: 251 LBS | OXYGEN SATURATION: 97 %

## 2024-11-18 DIAGNOSIS — Z00.00 ROUTINE GENERAL MEDICAL EXAMINATION AT A HEALTH CARE FACILITY: Primary | ICD-10-CM

## 2024-11-18 DIAGNOSIS — E78.2 MODERATE MIXED HYPERLIPIDEMIA NOT REQUIRING STATIN THERAPY: ICD-10-CM

## 2024-11-18 PROCEDURE — 99396 PREV VISIT EST AGE 40-64: CPT | Performed by: INTERNAL MEDICINE

## 2024-11-18 RX ORDER — OMEPRAZOLE 40 MG/1
CAPSULE, DELAYED RELEASE ORAL
COMMUNITY
Start: 2024-10-01

## 2024-11-18 NOTE — PROGRESS NOTES
"Chief Complaint   Patient presents with    Follow-up     Physical, Lab done last week       Subjective   Igor Smith is a 44 y.o. male here for   Chief Complaint   Patient presents with    Follow-up     Physical, Lab done last week   .    History of Present Illness     The following portions of the patient's history were reviewed and updated as appropriate: allergies, current medications, past family history, past medical history, past social history, past surgical history, and problem list.  Here for the physical today.  Overall doing very well.  No specific concerns.  We talked about diet and exercise habits.  Discussed prevention recommendations.   Review of Systems   All other systems reviewed and are negative.      Body mass index is 34.04 kg/m².   Vitals:    11/18/24 1005 11/18/24 1019   BP: 162/88 128/72   BP Location: Right arm    Patient Position: Sitting    Cuff Size: Adult    Pulse: 84    SpO2: 97%    Weight: 114 kg (251 lb)    Height: 182.9 cm (72\")         Objective   Physical Exam  Vitals and nursing note reviewed.   Constitutional:       General: He is not in acute distress.     Appearance: Normal appearance. He is not toxic-appearing.   HENT:      Head: Normocephalic and atraumatic.      Right Ear: Tympanic membrane, ear canal and external ear normal.      Left Ear: Tympanic membrane, ear canal and external ear normal.      Nose: Nose normal. No congestion or rhinorrhea.      Mouth/Throat:      Mouth: Mucous membranes are moist.      Pharynx: No oropharyngeal exudate.   Eyes:      General: No scleral icterus.        Right eye: No discharge.         Left eye: No discharge.      Extraocular Movements: Extraocular movements intact.      Conjunctiva/sclera: Conjunctivae normal.      Pupils: Pupils are equal, round, and reactive to light.   Cardiovascular:      Rate and Rhythm: Normal rate and regular rhythm.      Pulses: Normal pulses.      Heart sounds: Normal heart sounds. No murmur " heard.  Pulmonary:      Effort: Pulmonary effort is normal. No respiratory distress.      Breath sounds: Normal breath sounds. No wheezing.   Abdominal:      General: Abdomen is flat. Bowel sounds are normal. There is no distension.      Palpations: Abdomen is soft.      Tenderness: There is no abdominal tenderness. There is no guarding.   Musculoskeletal:         General: No swelling, tenderness or deformity. Normal range of motion.      Cervical back: Normal range of motion and neck supple. No rigidity or tenderness.   Lymphadenopathy:      Cervical: No cervical adenopathy.   Skin:     General: Skin is warm.      Capillary Refill: Capillary refill takes less than 2 seconds.   Neurological:      General: No focal deficit present.      Mental Status: He is alert and oriented to person, place, and time. Mental status is at baseline.      Cranial Nerves: No cranial nerve deficit.      Gait: Gait normal.   Psychiatric:         Mood and Affect: Mood normal.         Behavior: Behavior normal.         Thought Content: Thought content normal.         Judgment: Judgment normal.         Lab Results   Component Value Date    HGBA1C 5.40 11/14/2023    TSH 2.670 11/13/2024    WJHD86LG 28.4 (L) 11/14/2023    PSA 0.449 11/13/2024        Health Maintenance   Topic Date Due    COVID-19 Vaccine (4 - 2024-25 season) 09/01/2024    BMI FOLLOWUP  11/14/2024    INFLUENZA VACCINE  03/31/2025 (Originally 8/1/2024)    ANNUAL PHYSICAL  11/18/2025    TDAP/TD VACCINES (2 - Td or Tdap) 06/01/2026    HEPATITIS C SCREENING  Completed    Pneumococcal Vaccine 0-64  Aged Out        Assessment & Plan   Problems Addressed this Visit    None  Visit Diagnoses       Routine general medical examination at a health care facility    -  Primary    Moderate mixed hyperlipidemia not requiring statin therapy              Diagnoses         Codes Comments    Routine general medical examination at a health care facility    -  Primary ICD-10-CM: Z00.00  ICD-9-CM:  V70.0     Moderate mixed hyperlipidemia not requiring statin therapy     ICD-10-CM: E78.2  ICD-9-CM: 272.2           Annual exam -discussed routine screening, health maintenance, immunizations, etc  HLD - add fish oil and psyllium husk   F/u for annual exam    Raphael Cornejo MD  [facility]  [address] [place] [address]  Phone: [phone]

## 2024-12-18 ENCOUNTER — TELEPHONE (OUTPATIENT)
Dept: INTERNAL MEDICINE | Facility: CLINIC | Age: 44
End: 2024-12-18
Payer: COMMERCIAL

## 2024-12-18 NOTE — TELEPHONE ENCOUNTER
Caller: Igor Smith    Relationship: Self    Best call back number: [phone]     What does billing need from the patient: PATIENT WAS CHARGED FOR [facility]ER[employer] LAB WORK ON 11/13 DUE TO INSURANCE STATING THAT IT IS DEEMED OUTSIDE OF CONTRACT FOR ANNUAL PHYSICAL. HE IS WANTING TO KNOW IF THERE IS DIFFERENT CODING THAT NEEDS TO BE DONE SO HE CAN RESOLVE THIS BILL AND GET THE INSURANCE TO COVER

## 2024-12-27 NOTE — TELEPHONE ENCOUNTER
[employer] message sent to patient advising claim has been resubmitted by [facility] to insurance.